# Patient Record
(demographics unavailable — no encounter records)

---

## 2024-10-23 NOTE — PHYSICAL EXAM
[Normal Sclera/Conjunctiva] : normal sclera/conjunctiva [Normal Rate] : heart rate was normal  [No Murmurs] : no murmurs heard [Normal Bowel Sounds] : normal bowel sounds [de-identified] : lethargic , in distress [de-identified] : multiple bruise all over UE [de-identified] : Not AAO x 3

## 2024-10-23 NOTE — COUNSELING
[Advanced Directives discussed: ____] : Advanced directives discussed: [unfilled] [DNR] : Code Status: DNR [No Limitations] : Treatment Guidelines: No limitations [DNI] : Intubation: DNI [[facility]: _____[facility]last verification date: [unfilled] [de-identified] : Verbally mentioned the wishes , willing sign the paperwork in next visit , Hospice referral was done [de-identified] : HCP - daughter Mariel Cherise

## 2024-10-23 NOTE — HEALTH RISK ASSESSMENT
[Full assistance needed] : managing medications [] : managing finances [Patient not ambulatory (Wheelchair)] : Patient is not ambulatory (Wheelchair) [HRA Reviewed] : Health Risk Assessment reviewed [FreeTextEntry8] : bedbound [[facility]] : unable

## 2024-10-23 NOTE — REASON FOR VISIT
[Post-hospitalization from ___ Hospital] : Post-hospitalization from [unfilled] Hospital [Admitted on: ___] : The patient was admitted on [unfilled] [Discharged on ___] : discharged on [unfilled] [Discharge Summary] : discharge summary [Discharge Med List] : discharge medication list [Patient Contacted By: ____] : and contacted by [unfilled] [Pre-Visit Preparation] : Pre-visit preparation was done [Family Member] : family member [FreeTextEntry3] : Metastatic lung cancer, not on chemotherapy, full code, COPD, on home O2 2-3L NC and BiPAP at night, Chronic A-fib on 5 mg twice daily Eliquis, PPM, HFpEF, HTN, HLD, DM, chronic pelvic pain and burning and UTIs, decubitus ulcer [FreeTextEntry2] : 73-year-old female with PMH of lung cancer, not on chemotherapy, COPD, on home O2 2-4L NC and BiPAP at night, Chronic A-fib on 5 mg twice daily Eliquis, h/o SSS s/p PPM, HFpEF, HTN, HLD, DM, chronic pelvic pain and burning and UTIs, decubitus ulcer, presents with daughter with multiple complaints including decreased appetite, lethargy, decreased urine output, hypotension, tachy-bradycardia and left arm swelling. Patient admitted for management of acute on chronic hypercapnic, hypoxemic respiratory failure ISO acute cystitis. #Acute on chronic hypercapnic respiratory failure (non compliant with BIPAP at home) - currently on HFNC #H/o lung cancer with lymphangitic carcinomatosis - not on active chemo #Small Right Pleural Effusion #b/l Pulmonary Nodules #COPD - not in exacerbation - Heme/onc team including Dr. Kelsey, the primary oncologist, Zaynab Marr,  for [facility] had an extensive discussion with the daughter Mariel over phone explaining that it is not safe to proceed with any chemotherapy/Immunotherapy/targeted therapy right now as Ms Lopez has poor performance status. If her performance status improves and she is able to get discharged and come to [facility] clinic, she can be re-evaluated at that time, recommend hospice evaluation. - CT chest w/ IV on 10/12 shows: 1. Right lower lobe lung mass, similar to 9/6/2024 CT 2. Unchanged innumerable bilateral solid pulmonary nodules, measuring up to 1.4 cm at right upper lobe. 3. Slight increased small right pleural effusion, new trace left pleural effusion. - Pulmonary evaluated the effusion, determined no need for thora - currently on nasal canula - c/w Duoneb - pulm f/u: no need for thoracentesis #Elevated Troponin ISO of PÉREZ & COPD with hypercapnic hypoxemic respiratory failure (type 2 MI) #CAD s/p PCI #Chronic atrial fibrillation - not currently on AC d/t anemia #SSS s/p PPM #Diastolic HF (EF 58%, G2DD) #AS s/p TAVR #HTN - most recent TTE in 2023: EF 58 %. Grade II diastolic dysfunction. Bioprosthesis in the aortic position. CLAYTON 1.2 cm2, mild pulmonary hypertension. 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 - patient recently treated for MDR UTI in hospital and discharged 3 days prior to this admission - Urine culture 9/24L positive for Enterococcus - Hx of C glabrata in vaginitis panel  - was previously on Avycaz and Aztreonam - s/p Daptomycin 400mg IV qd (completed 7 days course as per ID recs on 10/10/24) this admission - currently off abx - afebrile #Acute on Chronic Macrocytic Anemia #Episode of Melena  - hgb stable 8.8 - most recent iron levels, folate and b12 levels normal - patient received 3u pRBC, last transfusion was 10/14 - No bloody BM or bleeding from anywhere - seen by GI, no intervention at this time - c/w PPI PO qD - trend H&H - keep active type and screen #Chronic pelvic discomfort #H/o of trichomonal vaginitis  - Hx of C glabrata in vaginitis panel  - GYN has seen patient in previous admission -> recommended boric acid outpatient for 2-3 weeks; unsure if patient complied to suggestions upon discharge  - Patient received diflucan x3 doses during last admission, as well as estradiol and clotrimazole cream  - Outpatient Urogyn consultation with Dr. Lilia Knutson or Dr. aLlita Knowles - boric acid not available in pharmacy. Will use clotrimazole cream instead. If not improving, Po fluconazole x 3 days - intravaginal estrogen cream #Sacral Ulcer stage 3 - wound care consulted - local wound care #Type II DM  - monitor FS, goal 140-180 - carb consistent diet - c/w ISS for now #H/o Seizure disorder - seizure precautions - c/w Keppra #PÉREZ (resolved) - Baseline creatinine: 1.6 (on 9/26/2024) - Creatinine 9/29: 2.3 with BUN 49 (ratio suggestive of pre-renal PÉREZ) - Renal Bladder US showed Atrophic echogenic kidneys which may reflect medical renal disease. No hydronephrosis. Moderately distended urinary  bladder. - Avoid nephrotoxic agents - monitor Cr #Left upper ext swelling - Resolved - Left UE duplex negative  #Supportive Management: Dispo: Home vs SNF DVT Ppx: apixaban 5 milliGRAM(s) Oral every 12 hours GI Ppx: pantoprazole Tablet 40 milliGRAM(s) Oral before breakfast Diet: Diet, DASH/TLC:   [FreeTextEntry4] : chart reviewed

## 2024-10-23 NOTE — PAST MEDICAL HISTORY
IOL scheduled for 12/18/19 at 0600   [PMH Reviewed and Updated] : past medical history reviewed and updated

## 2024-10-23 NOTE — REASON FOR VISIT
[Post-hospitalization from ___ Hospital] : Post-hospitalization from [unfilled] Hospital [Admitted on: ___] : The patient was admitted on [unfilled] [Discharged on ___] : discharged on [unfilled] [Discharge Summary] : discharge summary [Discharge Med List] : discharge medication list [Patient Contacted By: ____] : and contacted by [unfilled] [Pre-Visit Preparation] : Pre-visit preparation was done [Family Member] : family member [FreeTextEntry3] : Metastatic lung cancer, not on chemotherapy, full code, COPD, on home O2 2-3L NC and BiPAP at night, Chronic A-fib on 5 mg twice daily Eliquis, PPM, HFpEF, HTN, HLD, DM, chronic pelvic pain and burning and UTIs, decubitus ulcer [FreeTextEntry2] : 73-year-old female with PMH of lung cancer, not on chemotherapy, COPD, on home O2 2-4L NC and BiPAP at night, Chronic A-fib on 5 mg twice daily Eliquis, h/o SSS s/p PPM, HFpEF, HTN, HLD, DM, chronic pelvic pain and burning and UTIs, decubitus ulcer, presents with daughter with multiple complaints including decreased appetite, lethargy, decreased urine output, hypotension, tachy-bradycardia and left arm swelling. Patient admitted for management of acute on chronic hypercapnic, hypoxemic respiratory failure ISO acute cystitis. #Acute on chronic hypercapnic respiratory failure (non compliant with BIPAP at home) - currently on HFNC #H/o lung cancer with lymphangitic carcinomatosis - not on active chemo #Small Right Pleural Effusion #b/l Pulmonary Nodules #COPD - not in exacerbation - Heme/onc team including Dr. Kelsey, the primary oncologist, Zaynab Marr,  for [facility] had an extensive discussion with the daughter Mariel over phone explaining that it is not safe to proceed with any chemotherapy/Immunotherapy/targeted therapy right now as Ms Lopez has poor performance status. If her performance status improves and she is able to get discharged and come to [facility] clinic, she can be re-evaluated at that time, recommend hospice evaluation. - CT chest w/ IV on 10/12 shows: 1. Right lower lobe lung mass, similar to 9/6/2024 CT 2. Unchanged innumerable bilateral solid pulmonary nodules, measuring up to 1.4 cm at right upper lobe. 3. Slight increased small right pleural effusion, new trace left pleural effusion. - Pulmonary evaluated the effusion, determined no need for thora - currently on nasal canula - c/w Duoneb - pulm f/u: no need for thoracentesis #Elevated Troponin ISO of PÉREZ & COPD with hypercapnic hypoxemic respiratory failure (type 2 MI) #CAD s/p PCI #Chronic atrial fibrillation - not currently on AC d/t anemia #SSS s/p PPM #Diastolic HF (EF 58%, G2DD) #AS s/p TAVR #HTN - most recent TTE in 2023: EF 58 %. Grade II diastolic dysfunction. Bioprosthesis in the aortic position. CLAYTON 1.2 cm2, mild pulmonary hypertension. 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 - patient recently treated for MDR UTI in hospital and discharged 3 days prior to this admission - Urine culture 9/24L positive for Enterococcus - Hx of C glabrata in vaginitis panel  - was previously on Avycaz and Aztreonam - s/p Daptomycin 400mg IV qd (completed 7 days course as per ID recs on 10/10/24) this admission - currently off abx - afebrile #Acute on Chronic Macrocytic Anemia #Episode of Melena  - hgb stable 8.8 - most recent iron levels, folate and b12 levels normal - patient received 3u pRBC, last transfusion was 10/14 - No bloody BM or bleeding from anywhere - seen by GI, no intervention at this time - c/w PPI PO qD - trend H&H - keep active type and screen #Chronic pelvic discomfort #H/o of trichomonal vaginitis  - Hx of C glabrata in vaginitis panel  - GYN has seen patient in previous admission -> recommended boric acid outpatient for 2-3 weeks; unsure if patient complied to suggestions upon discharge  - Patient received diflucan x3 doses during last admission, as well as estradiol and clotrimazole cream  - Outpatient Urogyn consultation with Dr. Lilia Knutson or Dr. Lalita Knolwes - boric acid not available in pharmacy. Will use clotrimazole cream instead. If not improving, Po fluconazole x 3 days - intravaginal estrogen cream #Sacral Ulcer stage 3 - wound care consulted - local wound care #Type II DM  - monitor FS, goal 140-180 - carb consistent diet - c/w ISS for now #H/o Seizure disorder - seizure precautions - c/w Keppra #PÉREZ (resolved) - Baseline creatinine: 1.6 (on 9/26/2024) - Creatinine 9/29: 2.3 with BUN 49 (ratio suggestive of pre-renal PÉREZ) - Renal Bladder US showed Atrophic echogenic kidneys which may reflect medical renal disease. No hydronephrosis. Moderately distended urinary  bladder. - Avoid nephrotoxic agents - monitor Cr #Left upper ext swelling - Resolved - Left UE duplex negative  #Supportive Management: Dispo: Home vs SNF DVT Ppx: apixaban 5 milliGRAM(s) Oral every 12 hours GI Ppx: pantoprazole Tablet 40 milliGRAM(s) Oral before breakfast Diet: Diet, DASH/TLC:   [FreeTextEntry4] : chart reviewed

## 2024-10-23 NOTE — HEALTH RISK ASSESSMENT
[Full assistance needed] : managing medications [] : managing finances [Patient not ambulatory (Wheelchair)] : Patient is not ambulatory (Wheelchair) [HRA Reviewed] : Health Risk Assessment reviewed [FreeTextEntry8] : bedbound [[facility]] : unable

## 2024-10-23 NOTE — REASON FOR VISIT
[Post-hospitalization from ___ Hospital] : Post-hospitalization from [unfilled] Hospital [Admitted on: ___] : The patient was admitted on [unfilled] [Discharged on ___] : discharged on [unfilled] [Discharge Summary] : discharge summary [Discharge Med List] : discharge medication list [Patient Contacted By: ____] : and contacted by [unfilled] [Pre-Visit Preparation] : Pre-visit preparation was done [Family Member] : family member [FreeTextEntry3] : Metastatic lung cancer, not on chemotherapy, full code, COPD, on home O2 2-3L NC and BiPAP at night, Chronic A-fib on 5 mg twice daily Eliquis, PPM, HFpEF, HTN, HLD, DM, chronic pelvic pain and burning and UTIs, decubitus ulcer [FreeTextEntry2] : 73-year-old female with PMH of lung cancer, not on chemotherapy, COPD, on home O2 2-4L NC and BiPAP at night, Chronic A-fib on 5 mg twice daily Eliquis, h/o SSS s/p PPM, HFpEF, HTN, HLD, DM, chronic pelvic pain and burning and UTIs, decubitus ulcer, presents with daughter with multiple complaints including decreased appetite, lethargy, decreased urine output, hypotension, tachy-bradycardia and left arm swelling. Patient admitted for management of acute on chronic hypercapnic, hypoxemic respiratory failure ISO acute cystitis. #Acute on chronic hypercapnic respiratory failure (non compliant with BIPAP at home) - currently on HFNC #H/o lung cancer with lymphangitic carcinomatosis - not on active chemo #Small Right Pleural Effusion #b/l Pulmonary Nodules #COPD - not in exacerbation - Heme/onc team including Dr. Kelsey, the primary oncologist, Zaynab Marr,  for [facility] had an extensive discussion with the daughter Mariel over phone explaining that it is not safe to proceed with any chemotherapy/Immunotherapy/targeted therapy right now as Ms Lopez has poor performance status. If her performance status improves and she is able to get discharged and come to [facility] clinic, she can be re-evaluated at that time, recommend hospice evaluation. - CT chest w/ IV on 10/12 shows: 1. Right lower lobe lung mass, similar to 9/6/2024 CT 2. Unchanged innumerable bilateral solid pulmonary nodules, measuring up to 1.4 cm at right upper lobe. 3. Slight increased small right pleural effusion, new trace left pleural effusion. - Pulmonary evaluated the effusion, determined no need for thora - currently on nasal canula - c/w Duoneb - pulm f/u: no need for thoracentesis #Elevated Troponin ISO of PÉREZ & COPD with hypercapnic hypoxemic respiratory failure (type 2 MI) #CAD s/p PCI #Chronic atrial fibrillation - not currently on AC d/t anemia #SSS s/p PPM #Diastolic HF (EF 58%, G2DD) #AS s/p TAVR #HTN - most recent TTE in 2023: EF 58 %. Grade II diastolic dysfunction. Bioprosthesis in the aortic position. CLAYTON 1.2 cm2, mild pulmonary hypertension. 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 - patient recently treated for MDR UTI in hospital and discharged 3 days prior to this admission - Urine culture 9/24L positive for Enterococcus - Hx of C glabrata in vaginitis panel  - was previously on Avycaz and Aztreonam - s/p Daptomycin 400mg IV qd (completed 7 days course as per ID recs on 10/10/24) this admission - currently off abx - afebrile #Acute on Chronic Macrocytic Anemia #Episode of Melena  - hgb stable 8.8 - most recent iron levels, folate and b12 levels normal - patient received 3u pRBC, last transfusion was 10/14 - No bloody BM or bleeding from anywhere - seen by GI, no intervention at this time - c/w PPI PO qD - trend H&H - keep active type and screen #Chronic pelvic discomfort #H/o of trichomonal vaginitis  - Hx of C glabrata in vaginitis panel  - GYN has seen patient in previous admission -> recommended boric acid outpatient for 2-3 weeks; unsure if patient complied to suggestions upon discharge  - Patient received diflucan x3 doses during last admission, as well as estradiol and clotrimazole cream  - Outpatient Urogyn consultation with Dr. Lilia Knutson or Dr. Lalita Knowles - boric acid not available in pharmacy. Will use clotrimazole cream instead. If not improving, Po fluconazole x 3 days - intravaginal estrogen cream #Sacral Ulcer stage 3 - wound care consulted - local wound care #Type II DM  - monitor FS, goal 140-180 - carb consistent diet - c/w ISS for now #H/o Seizure disorder - seizure precautions - c/w Keppra #PÉREZ (resolved) - Baseline creatinine: 1.6 (on 9/26/2024) - Creatinine 9/29: 2.3 with BUN 49 (ratio suggestive of pre-renal PÉREZ) - Renal Bladder US showed Atrophic echogenic kidneys which may reflect medical renal disease. No hydronephrosis. Moderately distended urinary  bladder. - Avoid nephrotoxic agents - monitor Cr #Left upper ext swelling - Resolved - Left UE duplex negative  #Supportive Management: Dispo: Home vs SNF DVT Ppx: apixaban 5 milliGRAM(s) Oral every 12 hours GI Ppx: pantoprazole Tablet 40 milliGRAM(s) Oral before breakfast Diet: Diet, DASH/TLC:   [FreeTextEntry4] : chart reviewed

## 2024-10-23 NOTE — CHRONIC CARE ASSESSMENT
[Current] : Current Pain: [Lethargic] : lethargic [No Action Needed] : No action needed [PPS Score: ____] : Palliative Performance Scale (PPS) Score: [unfilled] [Oriented To Person] : not ~L oriented to person [Oriented To Place] : not ~L oriented to place [Oriented To Time] : not ~L oriented to time [Oriented To Situation] : not ~L oriented to situation [Alert] : not ~L alert

## 2024-10-23 NOTE — COUNSELING
[Advanced Directives discussed: ____] : Advanced directives discussed: [unfilled] [DNR] : Code Status: DNR [No Limitations] : Treatment Guidelines: No limitations [DNI] : Intubation: DNI [[facility]: _____[facility]/last verification date: [unfilled] [de-identified] : Verbally mentioned the wishes , willing sign the paperwork in next visit , Hospice referral was done [de-identified] : HCP - daughter Mariel Cherise

## 2024-10-23 NOTE — COMPREHENSIVE ASSESSMENT
Brayden [employer] - your Graves antibody labs are negative, I will await the results for your thyroid scan and ultrasound.      Best,  Dr. Farooq   [Comprehensive Assessment Reviewed] : Comprehensive assessment reviewed

## 2024-10-23 NOTE — REVIEW OF SYSTEMS
[Shortness Of Breath] : shortness of breath [Fever] : no fever [Chills] : no chills [Night Sweats] : no night sweats [Wheezing] : no wheezing [Abdominal Pain] : no abdominal pain

## 2024-10-23 NOTE — HISTORY OF PRESENT ILLNESS
[Patient] : patient [FreeTextEntry1] : Metastatic lung cancer, not on chemotherapy, full code, COPD, on home O2 2-3L NC and BiPAP at night, Chronic A-fib on 5 mg twice daily Eliquis, PPM, HFpEF, HTN, HLD, DM, chronic pelvic pain and burning and UTIs, decubitus ulcer [FreeTextEntry2] :    COVID SCREEN: Patient or caregiver denies fever, cough, trouble breathing, rash or contact with someone who tested positive for COVID-19.  N95 mask, gloves, eye wear and gown (if indicated) used during visit: YES  Total face to face time with patient is 45 min.           73-year-old female with the above past medical history, seen for post-hospital admission follow-up.  The patient is experiencing a decline in both functional and mental status. Vitals are stable today. The patient is awake but lethargic and reported experiencing pain. A detailed conversation was held with the patient's daughter and granddaughter. The prognosis remains guarded. Goals of care were discussed, and the family agreed to consult with the hospice team for further evaluation.

## 2024-10-23 NOTE — HEALTH RISK ASSESSMENT
[Full assistance needed] : managing medications [] : managing finances [Patient not ambulatory (Wheelchair)] : Patient is not ambulatory (Wheelchair) [HRA Reviewed] : Health Risk Assessment reviewed [FreeTextEntry8] : bedbound [[facility]] : unable

## 2024-10-23 NOTE — PHYSICAL EXAM
[Normal Sclera/Conjunctiva] : normal sclera/conjunctiva [Normal Rate] : heart rate was normal  [No Murmurs] : no murmurs heard [Normal Bowel Sounds] : normal bowel sounds [de-identified] : lethargic , in distress [de-identified] : multiple bruise all over UE [de-identified] : Not AAO x 3

## 2024-10-23 NOTE — COUNSELING
[Advanced Directives discussed: ____] : Advanced directives discussed: [unfilled] [DNR] : Code Status: DNR [No Limitations] : Treatment Guidelines: No limitations [DNI] : Intubation: DNI [[facility]: [facility]last verification date: [unfilled] [de-identified] : Verbally mentioned the wishes , willing sign the paperwork in next visit , Hospice referral was done [de-identified] : HCP - daughter Mariel Cherise

## 2024-10-23 NOTE — PHYSICAL EXAM
[Normal Sclera/Conjunctiva] : normal sclera/conjunctiva [Normal Rate] : heart rate was normal  [No Murmurs] : no murmurs heard [Normal Bowel Sounds] : normal bowel sounds [de-identified] : lethargic , in distress [de-identified] : multiple bruise all over UE [de-identified] : Not AAO x 3